# Patient Record
Sex: FEMALE | Race: OTHER | NOT HISPANIC OR LATINO
[De-identification: names, ages, dates, MRNs, and addresses within clinical notes are randomized per-mention and may not be internally consistent; named-entity substitution may affect disease eponyms.]

---

## 2017-11-21 ENCOUNTER — RESULT REVIEW (OUTPATIENT)
Age: 52
End: 2017-11-21

## 2017-12-04 PROBLEM — Z00.00 ENCOUNTER FOR PREVENTIVE HEALTH EXAMINATION: Status: ACTIVE | Noted: 2017-12-04

## 2017-12-12 ENCOUNTER — APPOINTMENT (OUTPATIENT)
Dept: OTOLARYNGOLOGY | Facility: CLINIC | Age: 52
End: 2017-12-12
Payer: MEDICAID

## 2017-12-12 VITALS
HEART RATE: 114 BPM | SYSTOLIC BLOOD PRESSURE: 137 MMHG | DIASTOLIC BLOOD PRESSURE: 93 MMHG | WEIGHT: 215 LBS | HEIGHT: 63.5 IN | BODY MASS INDEX: 37.62 KG/M2

## 2017-12-12 DIAGNOSIS — G89.29 LOW BACK PAIN: ICD-10-CM

## 2017-12-12 DIAGNOSIS — M54.5 LOW BACK PAIN: ICD-10-CM

## 2017-12-12 DIAGNOSIS — K21.9 GASTRO-ESOPHAGEAL REFLUX DISEASE W/OUT ESOPHAGITIS: ICD-10-CM

## 2017-12-12 DIAGNOSIS — E78.5 HYPERLIPIDEMIA, UNSPECIFIED: ICD-10-CM

## 2017-12-12 DIAGNOSIS — E11.9 TYPE 2 DIABETES MELLITUS W/OUT COMPLICATIONS: ICD-10-CM

## 2017-12-12 DIAGNOSIS — E04.1 NONTOXIC SINGLE THYROID NODULE: ICD-10-CM

## 2017-12-12 DIAGNOSIS — J45.909 UNSPECIFIED ASTHMA, UNCOMPLICATED: ICD-10-CM

## 2017-12-12 DIAGNOSIS — Z83.3 FAMILY HISTORY OF DIABETES MELLITUS: ICD-10-CM

## 2017-12-12 DIAGNOSIS — G56.00 CARPAL TUNNEL SYNDROME, UNSPECIFIED UPPER LIMB: ICD-10-CM

## 2017-12-12 DIAGNOSIS — I10 ESSENTIAL (PRIMARY) HYPERTENSION: ICD-10-CM

## 2017-12-12 DIAGNOSIS — Z80.0 FAMILY HISTORY OF MALIGNANT NEOPLASM OF DIGESTIVE ORGANS: ICD-10-CM

## 2017-12-12 PROCEDURE — 99204 OFFICE O/P NEW MOD 45 MIN: CPT | Mod: 25

## 2017-12-12 PROCEDURE — 31575 DIAGNOSTIC LARYNGOSCOPY: CPT

## 2017-12-12 RX ORDER — SPIRONOLACTONE 25 MG/1
25 TABLET ORAL
Refills: 0 | Status: ACTIVE | COMMUNITY

## 2017-12-12 RX ORDER — RAMIPRIL 5 MG/1
5 CAPSULE ORAL
Refills: 0 | Status: ACTIVE | COMMUNITY

## 2017-12-12 RX ORDER — DULOXETINE HYDROCHLORIDE 30 MG/1
CAPSULE, DELAYED RELEASE ORAL
Refills: 0 | Status: ACTIVE | COMMUNITY

## 2017-12-12 RX ORDER — RANITIDINE HYDROCHLORIDE 150 MG/1
150 CAPSULE ORAL
Refills: 0 | Status: ACTIVE | COMMUNITY

## 2017-12-12 RX ORDER — OXYCODONE HYDROCHLORIDE 30 MG/1
TABLET ORAL
Refills: 0 | Status: ACTIVE | COMMUNITY

## 2017-12-12 RX ORDER — TRAZODONE HYDROCHLORIDE 100 MG/1
100 TABLET ORAL
Refills: 0 | Status: ACTIVE | COMMUNITY

## 2017-12-12 RX ORDER — ARIPIPRAZOLE 2 MG/1
TABLET ORAL
Refills: 0 | Status: ACTIVE | COMMUNITY

## 2017-12-12 RX ORDER — ATORVASTATIN CALCIUM 80 MG/1
80 TABLET, FILM COATED ORAL
Refills: 0 | Status: ACTIVE | COMMUNITY

## 2018-05-04 ENCOUNTER — RESULT REVIEW (OUTPATIENT)
Age: 53
End: 2018-05-04

## 2018-07-01 ENCOUNTER — OUTPATIENT (OUTPATIENT)
Dept: OUTPATIENT SERVICES | Facility: HOSPITAL | Age: 53
LOS: 1 days | End: 2018-07-01
Payer: MEDICAID

## 2018-07-01 DIAGNOSIS — Z87.19 PERSONAL HISTORY OF OTHER DISEASES OF THE DIGESTIVE SYSTEM: Chronic | ICD-10-CM

## 2018-07-01 DIAGNOSIS — Z98.89 OTHER SPECIFIED POSTPROCEDURAL STATES: Chronic | ICD-10-CM

## 2018-07-11 DIAGNOSIS — Z71.89 OTHER SPECIFIED COUNSELING: ICD-10-CM

## 2018-07-23 PROBLEM — Z80.0 FAMILY HISTORY OF COLON CANCER: Status: ACTIVE | Noted: 2017-12-12

## 2018-11-01 ENCOUNTER — OUTPATIENT (OUTPATIENT)
Dept: OUTPATIENT SERVICES | Facility: HOSPITAL | Age: 53
LOS: 1 days | End: 2018-11-01
Payer: MEDICAID

## 2018-11-01 DIAGNOSIS — Z98.89 OTHER SPECIFIED POSTPROCEDURAL STATES: Chronic | ICD-10-CM

## 2018-11-01 DIAGNOSIS — Z87.19 PERSONAL HISTORY OF OTHER DISEASES OF THE DIGESTIVE SYSTEM: Chronic | ICD-10-CM

## 2018-11-20 ENCOUNTER — RESULT REVIEW (OUTPATIENT)
Age: 53
End: 2018-11-20

## 2019-02-26 ENCOUNTER — APPOINTMENT (OUTPATIENT)
Dept: OTOLARYNGOLOGY | Facility: CLINIC | Age: 54
End: 2019-02-26
Payer: MEDICAID

## 2019-02-26 VITALS
DIASTOLIC BLOOD PRESSURE: 100 MMHG | HEART RATE: 120 BPM | SYSTOLIC BLOOD PRESSURE: 168 MMHG | TEMPERATURE: 97.9 F | WEIGHT: 222 LBS | OXYGEN SATURATION: 96 % | BODY MASS INDEX: 38.85 KG/M2 | HEIGHT: 63.5 IN

## 2019-02-26 PROCEDURE — 99214 OFFICE O/P EST MOD 30 MIN: CPT | Mod: 25

## 2019-02-26 PROCEDURE — 31575 DIAGNOSTIC LARYNGOSCOPY: CPT

## 2019-02-26 RX ORDER — OMEPRAZOLE 40 MG/1
40 CAPSULE, DELAYED RELEASE ORAL TWICE DAILY
Qty: 60 | Refills: 1 | Status: ACTIVE | COMMUNITY
Start: 2019-02-26 | End: 1900-01-01

## 2019-02-26 NOTE — HISTORY OF PRESENT ILLNESS
[de-identified] : This is a patient referred by Dr Taylor for thyroid nodule. This was found a few years ago ago during physical exam/US.\par No dysphonia or dyspnea. C/O dysphagia, and that she has to swallow twice for the food to go down, and occasional choking. No weight loss. C/O flegm in the back of the throat and constant throat clearing. Patient has heartburn and has been on Omeprazole for that. Also has h/o nasal allergies.\par Patient denies h/o radiation and has no family h/o thyroid cancer.\par US from Nov/2018 showed that nodule has been stable compared to US from March/2018. Previous biopsy was benign.\par

## 2019-02-26 NOTE — PHYSICAL EXAM
[Midline] : trachea located in midline position [Normal] : no rashes [de-identified] : Mobile, nontender, 5 cm R thyroid nodule.

## 2019-02-26 NOTE — CONSULT LETTER
[Dear  ___] : Dear  [unfilled], [Consult Letter:] : I had the pleasure of evaluating your patient, [unfilled]. [Please see my note below.] : Please see my note below. [Consult Closing:] : Thank you very much for allowing me to participate in the care of this patient.  If you have any questions, please do not hesitate to contact me. [Sincerely,] : Sincerely, [FreeTextEntry2] : Dr Cleveland Taylor. [FreeTextEntry3] : \par Sameer Hanna MD, FACS\par \par Otolaryngology-Head and Neck Surgery\par [employer] [facility]\par

## 2019-03-28 ENCOUNTER — APPOINTMENT (OUTPATIENT)
Dept: OTOLARYNGOLOGY | Facility: CLINIC | Age: 54
End: 2019-03-28

## 2019-04-08 ENCOUNTER — APPOINTMENT (OUTPATIENT)
Dept: OTOLARYNGOLOGY | Facility: CLINIC | Age: 54
End: 2019-04-08

## 2019-05-08 ENCOUNTER — RESULT REVIEW (OUTPATIENT)
Age: 54
End: 2019-05-08

## 2019-08-14 DIAGNOSIS — Z71.89 OTHER SPECIFIED COUNSELING: ICD-10-CM

## 2020-02-01 PROCEDURE — H0002: CPT

## 2020-12-01 PROCEDURE — G9005: CPT

## 2021-01-01 ENCOUNTER — OUTPATIENT (OUTPATIENT)
Dept: OUTPATIENT SERVICES | Facility: HOSPITAL | Age: 56
LOS: 1 days | End: 2021-01-01
Payer: MEDICARE

## 2021-01-01 DIAGNOSIS — Z98.89 OTHER SPECIFIED POSTPROCEDURAL STATES: Chronic | ICD-10-CM

## 2021-01-01 DIAGNOSIS — Z87.19 PERSONAL HISTORY OF OTHER DISEASES OF THE DIGESTIVE SYSTEM: Chronic | ICD-10-CM

## 2021-02-01 PROCEDURE — G9005: CPT

## 2021-02-15 DIAGNOSIS — Z71.89 OTHER SPECIFIED COUNSELING: ICD-10-CM

## 2021-10-06 PROBLEM — I10 ESSENTIAL HYPERTENSION: Status: ACTIVE | Noted: 2017-12-12

## 2022-01-06 ENCOUNTER — APPOINTMENT (OUTPATIENT)
Dept: SURGERY | Facility: CLINIC | Age: 57
End: 2022-01-06
Payer: MEDICARE

## 2022-01-06 VITALS
DIASTOLIC BLOOD PRESSURE: 76 MMHG | WEIGHT: 225 LBS | HEART RATE: 90 BPM | HEIGHT: 63 IN | BODY MASS INDEX: 39.87 KG/M2 | SYSTOLIC BLOOD PRESSURE: 117 MMHG

## 2022-01-06 PROCEDURE — 99204 OFFICE O/P NEW MOD 45 MIN: CPT

## 2022-01-08 RX ORDER — INSULIN LISPRO 100 [IU]/ML
100 INJECTION, SOLUTION INTRAVENOUS; SUBCUTANEOUS
Refills: 0 | Status: ACTIVE | COMMUNITY

## 2022-01-08 RX ORDER — AMLODIPINE BESYLATE 10 MG/1
10 TABLET ORAL
Refills: 0 | Status: DISCONTINUED | COMMUNITY
End: 2022-01-08

## 2022-01-08 RX ORDER — FLUTICASONE FUROATE AND VILANTEROL TRIFENATATE 50; 25 UG/1; UG/1
POWDER RESPIRATORY (INHALATION)
Refills: 0 | Status: ACTIVE | COMMUNITY

## 2022-01-08 RX ORDER — DILTIAZEM HYDROCHLORIDE 240 MG/1
240 CAPSULE, EXTENDED RELEASE ORAL
Refills: 0 | Status: ACTIVE | COMMUNITY

## 2022-01-08 RX ORDER — METFORMIN HYDROCHLORIDE 500 MG/1
500 TABLET, COATED ORAL
Refills: 0 | Status: DISCONTINUED | COMMUNITY
End: 2022-01-08

## 2022-01-08 RX ORDER — METOPROLOL TARTRATE 100 MG/1
100 TABLET, FILM COATED ORAL
Refills: 0 | Status: ACTIVE | COMMUNITY

## 2022-01-08 RX ORDER — INSULIN DETEMIR 100 [IU]/ML
INJECTION, SOLUTION SUBCUTANEOUS
Refills: 0 | Status: ACTIVE | COMMUNITY

## 2022-01-08 RX ORDER — FUROSEMIDE 40 MG/1
40 TABLET ORAL
Refills: 0 | Status: ACTIVE | COMMUNITY

## 2022-01-08 RX ORDER — ALBUTEROL SULFATE 5 MG/ML
SOLUTION, NON-ORAL INHALATION
Refills: 0 | Status: ACTIVE | COMMUNITY

## 2022-01-08 RX ORDER — BACLOFEN 10 MG/1
10 TABLET ORAL
Refills: 0 | Status: ACTIVE | COMMUNITY

## 2022-01-08 RX ORDER — BUPROPION HYDROCHLORIDE 150 MG/1
150 TABLET, FILM COATED, EXTENDED RELEASE ORAL
Refills: 0 | Status: ACTIVE | COMMUNITY

## 2022-01-08 RX ORDER — EZETIMIBE 10 MG/1
10 TABLET ORAL
Refills: 0 | Status: ACTIVE | COMMUNITY

## 2022-01-08 RX ORDER — DULAGLUTIDE 0.75 MG/.5ML
0.75 INJECTION, SOLUTION SUBCUTANEOUS
Refills: 0 | Status: ACTIVE | COMMUNITY

## 2022-01-08 NOTE — PHYSICAL EXAM
[de-identified] : Short thick neck, no cervical or supraclavicular adenopathy, trachea midline, thyroid with R>L  enlargement extending behind clavicle [Normal] : no neck adenopathy [de-identified] : Skin:  normal appearance.  no rash, nodules, vesicles, or erythema,\par Musculoskeletal:  full range of motion and no deformities appreciated\par Neurological:  grossly intact\par Psychiatric:  oriented to person, place and time with appropriate affect

## 2022-01-08 NOTE — HISTORY OF PRESENT ILLNESS
[de-identified] : Patient referred by Dr. Corona for evaluation of large multinodular goiter and primary hyperparathyroidism.  Patient reports a long history of thyroid enlargement with worsening dysphagia.  Prior biopsies benign.  Reports occasional voice change with history of reflux, denies shortness of breath or radiation exposure.  Thyroid ultrasound May 2021: Right lobe 6.7 x 3.1 x 3.9 cm with 4.5 x 4 x 3.2 cm nodule.  Left lobe 4.3 x 1.3 x 1.9 cm with mid nodule 2 x 1 x 1.2 cm and lower 6 x 3 x 5 mm nodule.  Isthmus nodule 13 x 10 x 9 mm.  No enlarged lymph nodes.  Biopsy left mid nodule December 2020 benign.  Blood work August 2021: Calcium 10.3, creatinine 1.1, PTH 69, vitamin D 38.  Sestamibi August 2021: Possible parathyroid posterior to right upper pole. I have reviewed all old and new data and available images.  Additional information was obtained from others present at the time of the visit to ensure the completeness of the history.\par

## 2022-01-08 NOTE — ASSESSMENT
[FreeTextEntry1] : Patient with enlarging increasingly symptomatic substernal goiter and blood work consistent with mild primary hyperparathyroidism.  I have recommended a total thyroidectomy with a parathyroid exploration.  I have requested a 4-dimensional CT scan to assess the location of a parathyroid adenoma. I have reviewed the pathophysiology of the disease process, the area anatomy and the rationale for surgery.  I discussed the risks, benefits and alternative treatments which include but are not limited to bleeding, infection, numbness, hoarseness, hypocalcemia, scarring, and need for reoperation.  I have answered the patient's questions to their satisfaction.  The patient wishes to proceed with the recommended procedure.  They will contact my office to schedule surgery.\par

## 2022-01-08 NOTE — REASON FOR VISIT
[Initial Consultation] : an initial consultation for [FreeTextEntry2] : Substernal goiter and multinodular goiter. [Spouse] : spouse

## 2022-01-08 NOTE — CONSULT LETTER
[Dear  ___] : Dear  [unfilled], [Consult Letter:] : I had the pleasure of evaluating your patient, [unfilled]. [Please see my note below.] : Please see my note below. [Consult Closing:] : Thank you very much for allowing me to participate in the care of this patient.  If you have any questions, please do not hesitate to contact me. [Sincerely,] : Sincerely, [FreeTextEntry2] : Dr. Fidencio Corona [FreeTextEntry3] : Lizbeth Coronado MD, FACS\par Assistant Professor of Surgery and Otolaryngology\par [facility]\par

## 2022-01-13 ENCOUNTER — OUTPATIENT (OUTPATIENT)
Dept: OUTPATIENT SERVICES | Facility: HOSPITAL | Age: 57
LOS: 1 days | End: 2022-01-13
Payer: MEDICAID

## 2022-01-13 ENCOUNTER — APPOINTMENT (OUTPATIENT)
Dept: CT IMAGING | Facility: IMAGING CENTER | Age: 57
End: 2022-01-13
Payer: MEDICAID

## 2022-01-13 ENCOUNTER — RESULT REVIEW (OUTPATIENT)
Age: 57
End: 2022-01-13

## 2022-01-13 DIAGNOSIS — Z87.19 PERSONAL HISTORY OF OTHER DISEASES OF THE DIGESTIVE SYSTEM: Chronic | ICD-10-CM

## 2022-01-13 DIAGNOSIS — E04.9 NONTOXIC GOITER, UNSPECIFIED: ICD-10-CM

## 2022-01-13 DIAGNOSIS — Z98.89 OTHER SPECIFIED POSTPROCEDURAL STATES: Chronic | ICD-10-CM

## 2022-01-13 DIAGNOSIS — E04.2 NONTOXIC MULTINODULAR GOITER: ICD-10-CM

## 2022-01-13 DIAGNOSIS — E21.0 PRIMARY HYPERPARATHYROIDISM: ICD-10-CM

## 2022-01-13 PROCEDURE — 70491 CT SOFT TISSUE NECK W/DYE: CPT | Mod: 26

## 2022-01-13 PROCEDURE — 70492 CT SFT TSUE NCK W/O & W/DYE: CPT

## 2022-02-03 ENCOUNTER — NON-APPOINTMENT (OUTPATIENT)
Age: 57
End: 2022-02-03

## 2022-02-14 ENCOUNTER — APPOINTMENT (OUTPATIENT)
Dept: SURGERY | Facility: HOSPITAL | Age: 57
End: 2022-02-14

## 2022-03-08 ENCOUNTER — NON-APPOINTMENT (OUTPATIENT)
Age: 57
End: 2022-03-08

## 2022-03-12 ENCOUNTER — NON-APPOINTMENT (OUTPATIENT)
Age: 57
End: 2022-03-12

## 2022-03-21 ENCOUNTER — OUTPATIENT (OUTPATIENT)
Dept: OUTPATIENT SERVICES | Facility: HOSPITAL | Age: 57
LOS: 1 days | End: 2022-03-21
Payer: MEDICARE

## 2022-03-21 VITALS
HEART RATE: 100 BPM | RESPIRATION RATE: 18 BRPM | OXYGEN SATURATION: 94 % | SYSTOLIC BLOOD PRESSURE: 121 MMHG | TEMPERATURE: 98 F | DIASTOLIC BLOOD PRESSURE: 74 MMHG | HEIGHT: 64 IN | WEIGHT: 235.01 LBS

## 2022-03-21 DIAGNOSIS — I10 ESSENTIAL (PRIMARY) HYPERTENSION: ICD-10-CM

## 2022-03-21 DIAGNOSIS — Z01.818 ENCOUNTER FOR OTHER PREPROCEDURAL EXAMINATION: ICD-10-CM

## 2022-03-21 DIAGNOSIS — G47.33 OBSTRUCTIVE SLEEP APNEA (ADULT) (PEDIATRIC): ICD-10-CM

## 2022-03-21 DIAGNOSIS — J45.909 UNSPECIFIED ASTHMA, UNCOMPLICATED: ICD-10-CM

## 2022-03-21 DIAGNOSIS — Z98.89 OTHER SPECIFIED POSTPROCEDURAL STATES: Chronic | ICD-10-CM

## 2022-03-21 DIAGNOSIS — E04.9 NONTOXIC GOITER, UNSPECIFIED: ICD-10-CM

## 2022-03-21 DIAGNOSIS — Z87.19 PERSONAL HISTORY OF OTHER DISEASES OF THE DIGESTIVE SYSTEM: Chronic | ICD-10-CM

## 2022-03-21 DIAGNOSIS — E04.2 NONTOXIC MULTINODULAR GOITER: ICD-10-CM

## 2022-03-21 DIAGNOSIS — E21.0 PRIMARY HYPERPARATHYROIDISM: ICD-10-CM

## 2022-03-21 DIAGNOSIS — K21.9 GASTRO-ESOPHAGEAL REFLUX DISEASE WITHOUT ESOPHAGITIS: ICD-10-CM

## 2022-03-21 DIAGNOSIS — E11.9 TYPE 2 DIABETES MELLITUS WITHOUT COMPLICATIONS: ICD-10-CM

## 2022-03-21 PROCEDURE — G0463: CPT

## 2022-03-21 NOTE — H&P PST ADULT - HISTORY OF PRESENT ILLNESS
58 y/o female with PMh DM (type 2 56 y/o female with PMH DM (type 2), OS ( CPAP nightly), COVID-19 ( 4/2020, 1/2022 - both cases without pulmonary complications), arthritis, GERD, vertigo, asthma ( denies recent exacerbation or hx of intubation), chronic systolic heart failure, anemia, HTN, HLD, bipolar disorder, and depression presents for PST.  Patient reports that she has had a right sided goiter that was monitored by her endocrinologist and has been increasing in size.  Was negative for malignancy but recommended for surgical removal.  patient was also discovered to have eleavterd calcium leves requiring additional work up and was found to have hyperparathyroidism  Feeling well today at PST with no recent cough, fever or illness since recovering from COVID-19 in Jan 2022.  Scheduled for total thyroidectomy w/resection substernal goiter, parathyroidectomy w PTH monitoring with Dr Coronado on 03/28/2022.  COVID-19 testing TBS - information provided

## 2022-03-21 NOTE — H&P PST ADULT - NSICDXPASTSURGICALHX_GEN_ALL_CORE_FT
PAST SURGICAL HISTORY:  H/O hiatal hernia repair    History of carpal tunnel surgery of right wrist     S/P  section     S/P dilation and curettage 2 yrs ago

## 2022-03-21 NOTE — H&P PST ADULT - NSICDXPASTMEDICALHX_GEN_ALL_CORE_FT
PAST MEDICAL HISTORY:  Acid reflux     Asthma no recent exacerbation    Bronchitis 2 weeks ago    Carpal tunnel syndrome     Chronic systolic heart failure     Diverticulosis     DM2 (diabetes mellitus, type 2)     History of 2019 novel coronavirus disease (COVID-19) 4/2020, 1/2022 - not hospitalized    HLD (hyperlipidemia)     HTN (hypertension)     Pulmonary hypertension     Sleep apnea, obstructive on CPAP mask    Thyroid nodule     Vertigo      PAST MEDICAL HISTORY:  Acid reflux     Arthritis     Asthma no recent exacerbation    Carpal tunnel syndrome     Chronic systolic heart failure     Diverticulosis     DM2 (diabetes mellitus, type 2)     History of 2019 novel coronavirus disease (COVID-19) 4/2020, 1/2022 - not hospitalized    HLD (hyperlipidemia)     HTN (hypertension)     Primary hyperparathyroidism     Pulmonary hypertension     Sleep apnea, obstructive on CPAP mask    Thyroid nodule     Vertigo

## 2022-03-21 NOTE — H&P PST ADULT - NEGATIVE ENMT SYMPTOMS
no hearing difficulty/no ear pain/no vertigo/no nasal discharge/no nasal obstruction/no post-nasal discharge/no nose bleeds/no throat pain

## 2022-03-21 NOTE — H&P PST ADULT - NSICDXFAMILYHX_GEN_ALL_CORE_FT
FAMILY HISTORY:  Father  Still living? No  Family history of colon cancer, Age at diagnosis: Age Unknown    Mother  Still living? No  Family history of myocardial infarction, Age at diagnosis: Age Unknown

## 2022-03-21 NOTE — H&P PST ADULT - PROBLEM SELECTOR PLAN 1
Scheduled for total thyroidectomy w/ resection substernal goiter, parathyroidectomy w/PTH monitor with Dr Coronado on 03/28/2022.  CBC, BMP, EKG on chart.  Pending medical and cardio clearance.  Pre op instructions given and patient verbalized understanding.  Chlorhexidene wash given with instructions.  AGNIESZKA precautions.  NPO after midnight, May take PPI and antihypertensives with small sip of water AM of surgery.  TO stop all asa, NSAIDs, vitamins and supplements 1 week prior to procedure.  UCG AM of surgery

## 2022-03-21 NOTE — H&P PST ADULT - MUSCULOSKELETAL COMMENTS
lower back, neck pain, bilateral knee pain bilateral knee pain with flexion and extension - per patient her baseline

## 2022-03-25 PROBLEM — E11.9 TYPE 2 DIABETES MELLITUS WITHOUT COMPLICATIONS: Chronic | Status: ACTIVE | Noted: 2022-03-21

## 2022-03-25 PROBLEM — I50.22 CHRONIC SYSTOLIC (CONGESTIVE) HEART FAILURE: Chronic | Status: ACTIVE | Noted: 2022-03-21

## 2022-03-25 PROBLEM — Z86.16 PERSONAL HISTORY OF COVID-19: Chronic | Status: ACTIVE | Noted: 2022-03-21

## 2022-03-25 PROBLEM — I27.20 PULMONARY HYPERTENSION, UNSPECIFIED: Chronic | Status: ACTIVE | Noted: 2022-03-21

## 2022-03-25 PROBLEM — E21.0 PRIMARY HYPERPARATHYROIDISM: Chronic | Status: ACTIVE | Noted: 2022-03-21

## 2022-03-25 PROBLEM — M19.90 UNSPECIFIED OSTEOARTHRITIS, UNSPECIFIED SITE: Chronic | Status: ACTIVE | Noted: 2022-03-21

## 2022-03-27 ENCOUNTER — TRANSCRIPTION ENCOUNTER (OUTPATIENT)
Age: 57
End: 2022-03-27

## 2022-03-28 ENCOUNTER — RESULT REVIEW (OUTPATIENT)
Age: 57
End: 2022-03-28

## 2022-03-28 ENCOUNTER — APPOINTMENT (OUTPATIENT)
Dept: SURGERY | Facility: HOSPITAL | Age: 57
End: 2022-03-28
Payer: MEDICAID

## 2022-03-28 ENCOUNTER — OUTPATIENT (OUTPATIENT)
Dept: OUTPATIENT SERVICES | Facility: HOSPITAL | Age: 57
LOS: 1 days | End: 2022-03-28
Payer: MEDICARE

## 2022-03-28 ENCOUNTER — TRANSCRIPTION ENCOUNTER (OUTPATIENT)
Age: 57
End: 2022-03-28

## 2022-03-28 VITALS
TEMPERATURE: 98 F | OXYGEN SATURATION: 95 % | RESPIRATION RATE: 18 BRPM | DIASTOLIC BLOOD PRESSURE: 67 MMHG | SYSTOLIC BLOOD PRESSURE: 130 MMHG | HEART RATE: 73 BPM

## 2022-03-28 VITALS
DIASTOLIC BLOOD PRESSURE: 78 MMHG | RESPIRATION RATE: 16 BRPM | WEIGHT: 235.01 LBS | HEIGHT: 64 IN | OXYGEN SATURATION: 94 % | HEART RATE: 92 BPM | SYSTOLIC BLOOD PRESSURE: 118 MMHG | TEMPERATURE: 97 F

## 2022-03-28 DIAGNOSIS — Z98.89 OTHER SPECIFIED POSTPROCEDURAL STATES: Chronic | ICD-10-CM

## 2022-03-28 DIAGNOSIS — E21.0 PRIMARY HYPERPARATHYROIDISM: ICD-10-CM

## 2022-03-28 DIAGNOSIS — Z87.19 PERSONAL HISTORY OF OTHER DISEASES OF THE DIGESTIVE SYSTEM: Chronic | ICD-10-CM

## 2022-03-28 DIAGNOSIS — E04.9 NONTOXIC GOITER, UNSPECIFIED: ICD-10-CM

## 2022-03-28 DIAGNOSIS — E04.2 NONTOXIC MULTINODULAR GOITER: ICD-10-CM

## 2022-03-28 LAB
GLUCOSE BLDC GLUCOMTR-MCNC: 111 MG/DL — HIGH (ref 70–99)
GLUCOSE BLDC GLUCOMTR-MCNC: 197 MG/DL — HIGH (ref 70–99)
PTH INTACT, INTRAOP SPECIMEN 2: SIGNIFICANT CHANGE UP
PTH INTACT, INTRAOP SPECIMEN 3: SIGNIFICANT CHANGE UP
PTH INTACT, INTRAOP SPECIMEN 4: SIGNIFICANT CHANGE UP
PTH INTACT, INTRAOP SPECIMEN 5: SIGNIFICANT CHANGE UP
PTH INTACT, INTRAOP SPECIMEN 6: SIGNIFICANT CHANGE UP
PTH INTACT, INTRAOP TIMING 2: SIGNIFICANT CHANGE UP
PTH INTACT, INTRAOP TIMING 3: SIGNIFICANT CHANGE UP
PTH INTACT, INTRAOP TIMING 4: SIGNIFICANT CHANGE UP
PTH INTACT, INTRAOP TIMING 5: SIGNIFICANT CHANGE UP
PTH INTACT, INTRAOP TIMING 6: SIGNIFICANT CHANGE UP
PTH INTACT, INTRAOPERATIVE 2: 60 PG/ML — SIGNIFICANT CHANGE UP (ref 15–65)
PTH INTACT, INTRAOPERATIVE 3: 68 PG/ML — HIGH (ref 15–65)
PTH INTACT, INTRAOPERATIVE 4: 33 PG/ML — SIGNIFICANT CHANGE UP (ref 15–65)
PTH INTACT, INTRAOPERATIVE 5: 21 PG/ML — SIGNIFICANT CHANGE UP (ref 15–65)
PTH INTACT, INTRAOPERATIVE 6: 16 PG/ML — SIGNIFICANT CHANGE UP (ref 15–65)
PTH-INTACT IO % DIF SERPL: 72 PG/ML — HIGH (ref 15–65)

## 2022-03-28 PROCEDURE — 36415 COLL VENOUS BLD VENIPUNCTURE: CPT

## 2022-03-28 PROCEDURE — 60271 REMOVAL OF THYROID: CPT

## 2022-03-28 PROCEDURE — 13132 CMPLX RPR F/C/C/M/N/AX/G/H/F: CPT | Mod: 59

## 2022-03-28 PROCEDURE — 83970 ASSAY OF PARATHORMONE: CPT

## 2022-03-28 PROCEDURE — C9399: CPT

## 2022-03-28 PROCEDURE — 60220 PARTIAL REMOVAL OF THYROID: CPT | Mod: XU

## 2022-03-28 PROCEDURE — 88307 TISSUE EXAM BY PATHOLOGIST: CPT

## 2022-03-28 PROCEDURE — 60500 EXPLORE PARATHYROID GLANDS: CPT

## 2022-03-28 PROCEDURE — 88307 TISSUE EXAM BY PATHOLOGIST: CPT | Mod: 26

## 2022-03-28 PROCEDURE — 88331 PATH CONSLTJ SURG 1 BLK 1SPC: CPT | Mod: 26

## 2022-03-28 PROCEDURE — 13133 CMPLX RPR F/C/C/M/N/AX/G/H/F: CPT | Mod: 59

## 2022-03-28 PROCEDURE — 82962 GLUCOSE BLOOD TEST: CPT

## 2022-03-28 PROCEDURE — 88332 PATH CONSLTJ SURG EA ADD BLK: CPT | Mod: 26

## 2022-03-28 PROCEDURE — 88331 PATH CONSLTJ SURG 1 BLK 1SPC: CPT

## 2022-03-28 PROCEDURE — 88305 TISSUE EXAM BY PATHOLOGIST: CPT

## 2022-03-28 PROCEDURE — 60210 PARTIAL THYROID EXCISION: CPT | Mod: AS

## 2022-03-28 PROCEDURE — 88332 PATH CONSLTJ SURG EA ADD BLK: CPT

## 2022-03-28 PROCEDURE — 88305 TISSUE EXAM BY PATHOLOGIST: CPT | Mod: 26

## 2022-03-28 RX ORDER — BENZOCAINE AND MENTHOL 5; 1 G/100ML; G/100ML
1 LIQUID ORAL
Qty: 0 | Refills: 0 | DISCHARGE
Start: 2022-03-28

## 2022-03-28 RX ORDER — EZETIMIBE 10 MG/1
1 TABLET ORAL
Qty: 0 | Refills: 0 | DISCHARGE

## 2022-03-28 RX ORDER — ACETAMINOPHEN 500 MG
650 TABLET ORAL ONCE
Refills: 0 | Status: COMPLETED | OUTPATIENT
Start: 2022-03-28 | End: 2022-03-28

## 2022-03-28 RX ORDER — AMITRIPTYLINE HCL 25 MG
1 TABLET ORAL
Qty: 0 | Refills: 0 | DISCHARGE

## 2022-03-28 RX ORDER — METOPROLOL TARTRATE 50 MG
1 TABLET ORAL
Qty: 0 | Refills: 0 | DISCHARGE

## 2022-03-28 RX ORDER — DILTIAZEM HCL 120 MG
1 CAPSULE, EXT RELEASE 24 HR ORAL
Qty: 0 | Refills: 0 | DISCHARGE

## 2022-03-28 RX ORDER — FUROSEMIDE 40 MG
1 TABLET ORAL
Qty: 0 | Refills: 0 | DISCHARGE

## 2022-03-28 RX ORDER — INSULIN LISPRO 100/ML
16 VIAL (ML) SUBCUTANEOUS
Qty: 0 | Refills: 0 | DISCHARGE

## 2022-03-28 RX ORDER — BUPROPION HYDROCHLORIDE 150 MG/1
1 TABLET, EXTENDED RELEASE ORAL
Qty: 0 | Refills: 0 | DISCHARGE

## 2022-03-28 RX ORDER — FLUTICASONE FUROATE AND VILANTEROL TRIFENATATE 100; 25 UG/1; UG/1
0 POWDER RESPIRATORY (INHALATION)
Qty: 0 | Refills: 0 | DISCHARGE

## 2022-03-28 RX ORDER — BACLOFEN 100 %
1 POWDER (GRAM) MISCELLANEOUS
Qty: 0 | Refills: 0 | DISCHARGE

## 2022-03-28 RX ORDER — FLUTICASONE FUROATE AND VILANTEROL TRIFENATATE 100; 25 UG/1; UG/1
1 POWDER RESPIRATORY (INHALATION)
Qty: 0 | Refills: 0 | DISCHARGE

## 2022-03-28 RX ORDER — MULTIVIT-MIN/FERROUS GLUCONATE 9 MG/15 ML
1 LIQUID (ML) ORAL
Qty: 0 | Refills: 0 | DISCHARGE

## 2022-03-28 RX ORDER — DULOXETINE HYDROCHLORIDE 30 MG/1
120 CAPSULE, DELAYED RELEASE ORAL
Qty: 0 | Refills: 0 | DISCHARGE

## 2022-03-28 RX ORDER — BENZOCAINE AND MENTHOL 5; 1 G/100ML; G/100ML
1 LIQUID ORAL
Refills: 0 | Status: DISCONTINUED | OUTPATIENT
Start: 2022-03-28 | End: 2022-04-11

## 2022-03-28 RX ORDER — SACUBITRIL AND VALSARTAN 24; 26 MG/1; MG/1
1 TABLET, FILM COATED ORAL
Qty: 0 | Refills: 0 | DISCHARGE

## 2022-03-28 RX ORDER — OXYCODONE HYDROCHLORIDE 5 MG/1
5 TABLET ORAL EVERY 6 HOURS
Refills: 0 | Status: DISCONTINUED | OUTPATIENT
Start: 2022-03-28 | End: 2022-03-28

## 2022-03-28 RX ORDER — SODIUM CHLORIDE 9 MG/ML
1000 INJECTION, SOLUTION INTRAVENOUS
Refills: 0 | Status: DISCONTINUED | OUTPATIENT
Start: 2022-03-28 | End: 2022-03-28

## 2022-03-28 RX ORDER — ONDANSETRON 8 MG/1
4 TABLET, FILM COATED ORAL ONCE
Refills: 0 | Status: DISCONTINUED | OUTPATIENT
Start: 2022-03-28 | End: 2022-03-28

## 2022-03-28 RX ORDER — ATORVASTATIN CALCIUM 80 MG/1
1 TABLET, FILM COATED ORAL
Qty: 0 | Refills: 0 | DISCHARGE

## 2022-03-28 RX ORDER — DULAGLUTIDE 4.5 MG/.5ML
0 INJECTION, SOLUTION SUBCUTANEOUS
Qty: 0 | Refills: 0 | DISCHARGE

## 2022-03-28 RX ORDER — CALCITRIOL 0.5 UG/1
0.5 CAPSULE ORAL
Refills: 0 | Status: DISCONTINUED | OUTPATIENT
Start: 2022-03-28 | End: 2022-04-11

## 2022-03-28 RX ORDER — INSULIN DETEMIR 100/ML (3)
40 INSULIN PEN (ML) SUBCUTANEOUS
Qty: 0 | Refills: 0 | DISCHARGE

## 2022-03-28 RX ORDER — CALCITRIOL 0.5 UG/1
1 CAPSULE ORAL
Qty: 60 | Refills: 0
Start: 2022-03-28 | End: 2022-04-26

## 2022-03-28 RX ORDER — OXYCODONE HYDROCHLORIDE 5 MG/1
1 TABLET ORAL
Qty: 3 | Refills: 0
Start: 2022-03-28 | End: 2022-03-28

## 2022-03-28 RX ORDER — SPIRONOLACTONE 25 MG/1
1 TABLET, FILM COATED ORAL
Qty: 0 | Refills: 0 | DISCHARGE

## 2022-03-28 RX ORDER — HYDROMORPHONE HYDROCHLORIDE 2 MG/ML
0.5 INJECTION INTRAMUSCULAR; INTRAVENOUS; SUBCUTANEOUS
Refills: 0 | Status: DISCONTINUED | OUTPATIENT
Start: 2022-03-28 | End: 2022-03-28

## 2022-03-28 RX ORDER — ARIPIPRAZOLE 15 MG/1
1 TABLET ORAL
Qty: 0 | Refills: 0 | DISCHARGE

## 2022-03-28 RX ORDER — HYDROMORPHONE HYDROCHLORIDE 2 MG/ML
1 INJECTION INTRAMUSCULAR; INTRAVENOUS; SUBCUTANEOUS
Refills: 0 | Status: DISCONTINUED | OUTPATIENT
Start: 2022-03-28 | End: 2022-03-28

## 2022-03-28 RX ADMIN — Medication 650 MILLIGRAM(S): at 15:10

## 2022-03-28 RX ADMIN — Medication 2 TABLET(S): at 12:25

## 2022-03-28 RX ADMIN — Medication 650 MILLIGRAM(S): at 14:38

## 2022-03-28 RX ADMIN — CALCITRIOL 0.5 MICROGRAM(S): 0.5 CAPSULE ORAL at 12:25

## 2022-03-28 RX ADMIN — SODIUM CHLORIDE 30 MILLILITER(S): 9 INJECTION, SOLUTION INTRAVENOUS at 06:48

## 2022-03-28 NOTE — ASU PATIENT PROFILE, ADULT - FALL HARM RISK - UNIVERSAL INTERVENTIONS
Bed in lowest position, wheels locked, appropriate side rails in place/Call bell, personal items and telephone in reach/Instruct patient to call for assistance before getting out of bed or chair/Non-slip footwear when patient is out of bed/[place] to call system/Physically safe environment - no spills, clutter or unnecessary equipment/Purposeful Proactive Rounding/Room/bathroom lighting operational, light cord in reach

## 2022-03-28 NOTE — BRIEF OPERATIVE NOTE - NSICDXBRIEFPOSTOP_GEN_ALL_CORE_FT
POST-OP DIAGNOSIS:  Right thyroid nodule 28-Mar-2022 11:10:23 and substernal goiter / hyperparathyroidism Preethi Pardo

## 2022-03-28 NOTE — ASU DISCHARGE PLAN (ADULT/PEDIATRIC) - NS MD DC FALL RISK RISK
For information on Fall & Injury Prevention, visit: [email or web address] OR  [email or web address] OR  https://www.cdc.gov/steadi/patient.html

## 2022-03-28 NOTE — ASU PATIENT PROFILE, ADULT - NSICDXPASTMEDICALHX_GEN_ALL_CORE_FT
PAST MEDICAL HISTORY:  Acid reflux     Arthritis     Asthma no recent exacerbation    Carpal tunnel syndrome     Chronic systolic heart failure     Diverticulosis     DM2 (diabetes mellitus, type 2)     History of 2019 novel coronavirus disease (COVID-19) 4/2020, 1/2022 - not hospitalized    HLD (hyperlipidemia)     HTN (hypertension)     Primary hyperparathyroidism     Pulmonary hypertension     Sleep apnea, obstructive on CPAP mask    Thyroid nodule     Vertigo

## 2022-03-28 NOTE — ASU DISCHARGE PLAN (ADULT/PEDIATRIC) - CARE PROVIDER_API CALL
Lizbeth Coronado (MD)  Surgery  [address], [address]  [place] [address]  Phone: [phone]  Fax: [phone]  Scheduled Appointment: 03/29/2022

## 2022-03-28 NOTE — ASU PATIENT PROFILE, ADULT - PAIN LOCATION, PROFILE
lower back, bilateral knees, neck/upper/lower lower back, bilateral knees, neck, sciatica/upper/lower

## 2022-03-28 NOTE — BRIEF OPERATIVE NOTE - NSICDXBRIEFPROCEDURE_GEN_ALL_CORE_FT
PROCEDURES:  Thyroid lobectomy, right 28-Mar-2022 11:13:20 with substernal goiter /parathyroidectomy Preethi Pardo

## 2022-03-28 NOTE — ASU DISCHARGE PLAN (ADULT/PEDIATRIC) - ASU DC SPECIAL INSTRUCTIONSFT
Elevate head to 30 degrees when sleeping   ice pack to neck as needed   cepacol lozenges for sore throat as needed may purchase over the counter   Wear bra for 2 weeks day and night to take pressure off incision   Take tylenol for pain as needed and directed 2 tabs 650mg po every 6 hours   Take your percocet if pain severe - you have pain meds at home   empty and record drains twice a day or as needed   Follow up with Dr Coronado tomorrow for drain removal - call office for time   Take calcium supplement s as prescribed to pharmacy Elevate head to 30 degrees when sleeping   ice pack to neck as needed   cepacol lozenges for sore throat as needed may purchase over the counter   Wear bra for 2 weeks day and night to take pressure off incision   Take tylenol for pain as needed and directed 2 tabs 650mg po every 6 hours   Take your percocet if pain severe - you have pain meds at home   empty and record drains twice a day or as needed   Follow up with Dr Coronado tomorrow for drain removal - at 2:30 pm  Take calcium supplement s as prescribed to pharmacy

## 2022-03-28 NOTE — BRIEF OPERATIVE NOTE - NSICDXBRIEFPREOP_GEN_ALL_CORE_FT
PRE-OP DIAGNOSIS:  Multiple thyroid nodules 28-Mar-2022 11:08:37 and hyperparathyroidism Preethi Pardo

## 2022-03-29 ENCOUNTER — APPOINTMENT (OUTPATIENT)
Dept: SURGERY | Facility: CLINIC | Age: 57
End: 2022-03-29
Payer: MEDICAID

## 2022-03-29 PROCEDURE — 99024 POSTOP FOLLOW-UP VISIT: CPT

## 2022-03-29 PROCEDURE — 36415 COLL VENOUS BLD VENIPUNCTURE: CPT

## 2022-03-29 RX ORDER — BLOOD SUGAR DIAGNOSTIC
STRIP MISCELLANEOUS
Qty: 300 | Refills: 0 | Status: ACTIVE | COMMUNITY
Start: 2022-01-11

## 2022-03-29 RX ORDER — ARIPIPRAZOLE 20 MG/1
20 TABLET ORAL
Qty: 30 | Refills: 0 | Status: ACTIVE | COMMUNITY
Start: 2022-02-01

## 2022-03-29 RX ORDER — SPIRONOLACTONE 50 MG/1
50 TABLET ORAL
Qty: 90 | Refills: 0 | Status: ACTIVE | COMMUNITY
Start: 2021-05-24

## 2022-03-29 RX ORDER — DULOXETINE HYDROCHLORIDE 60 MG/1
60 CAPSULE, DELAYED RELEASE PELLETS ORAL
Qty: 60 | Refills: 0 | Status: ACTIVE | COMMUNITY
Start: 2021-11-24

## 2022-03-29 RX ORDER — FLASH GLUCOSE SENSOR
KIT MISCELLANEOUS
Qty: 2 | Refills: 0 | Status: ACTIVE | COMMUNITY
Start: 2021-10-26

## 2022-03-29 RX ORDER — FLUCONAZOLE 150 MG/1
150 TABLET ORAL
Qty: 2 | Refills: 0 | Status: ACTIVE | COMMUNITY
Start: 2022-03-04

## 2022-03-29 RX ORDER — EPINEPHRINE 0.3 MG/.3ML
0.3 INJECTION INTRAMUSCULAR
Qty: 2 | Refills: 0 | Status: ACTIVE | COMMUNITY
Start: 2022-02-10

## 2022-03-29 RX ORDER — LANCETS 30 GAUGE
EACH MISCELLANEOUS
Qty: 300 | Refills: 0 | Status: ACTIVE | COMMUNITY
Start: 2022-01-11

## 2022-03-29 RX ORDER — DOCUSATE SODIUM 100 MG/1
100 CAPSULE, LIQUID FILLED ORAL
Qty: 90 | Refills: 0 | Status: ACTIVE | COMMUNITY
Start: 2021-05-26

## 2022-03-29 RX ORDER — SODIUM SULFATE, MAGNESIUM SULFATE, AND POTASSIUM CHLORIDE 17.75; 2.7; 2.25 G/1; G/1; G/1
1479-225-188 TABLET ORAL
Qty: 24 | Refills: 0 | Status: ACTIVE | COMMUNITY
Start: 2021-11-03

## 2022-03-29 RX ORDER — SACUBITRIL AND VALSARTAN 24; 26 MG/1; MG/1
24-26 TABLET, FILM COATED ORAL
Qty: 90 | Refills: 0 | Status: ACTIVE | COMMUNITY
Start: 2021-12-21

## 2022-03-29 RX ORDER — PEN NEEDLE, DIABETIC 29 G X1/2"
31G X 8 MM NEEDLE, DISPOSABLE MISCELLANEOUS
Qty: 100 | Refills: 0 | Status: ACTIVE | COMMUNITY
Start: 2022-03-03

## 2022-03-29 RX ORDER — METOPROLOL SUCCINATE 100 MG/1
100 TABLET, EXTENDED RELEASE ORAL
Qty: 90 | Refills: 0 | Status: ACTIVE | COMMUNITY
Start: 2021-12-21

## 2022-03-29 RX ORDER — ALBUTEROL SULFATE 90 UG/1
108 (90 BASE) INHALANT RESPIRATORY (INHALATION)
Qty: 18 | Refills: 0 | Status: ACTIVE | COMMUNITY
Start: 2021-11-22

## 2022-03-29 RX ORDER — AMITRIPTYLINE HYDROCHLORIDE 100 MG/1
100 TABLET, FILM COATED ORAL
Qty: 30 | Refills: 0 | Status: ACTIVE | COMMUNITY
Start: 2022-03-21

## 2022-03-29 RX ORDER — SODIUM SULFATE, POTASSIUM SULFATE, MAGNESIUM SULFATE 17.5; 3.13; 1.6 G/ML; G/ML; G/ML
17.5-3.13-1.6 SOLUTION, CONCENTRATE ORAL
Qty: 354 | Refills: 0 | Status: ACTIVE | COMMUNITY
Start: 2021-12-09

## 2022-03-29 RX ORDER — BUPROPION HYDROCHLORIDE 150 MG/1
150 TABLET, EXTENDED RELEASE ORAL
Qty: 30 | Refills: 0 | Status: ACTIVE | COMMUNITY
Start: 2022-03-21

## 2022-03-29 RX ORDER — VALACYCLOVIR HYDROCHLORIDE 500 MG/1
500 TABLET, FILM COATED ORAL
Qty: 30 | Refills: 0 | Status: ACTIVE | COMMUNITY
Start: 2022-03-04

## 2022-03-29 RX ORDER — INSULIN DETEMIR 100 [IU]/ML
100 INJECTION, SOLUTION SUBCUTANEOUS
Qty: 30 | Refills: 0 | Status: ACTIVE | COMMUNITY
Start: 2021-10-22

## 2022-03-29 RX ORDER — MECLIZINE HYDROCHLORIDE 25 MG/1
25 TABLET ORAL
Qty: 90 | Refills: 0 | Status: ACTIVE | COMMUNITY
Start: 2022-02-04

## 2022-03-29 RX ORDER — ISOPROPYL ALCOHOL 0.7 ML/ML
SWAB TOPICAL
Qty: 400 | Refills: 0 | Status: ACTIVE | COMMUNITY
Start: 2022-01-11

## 2022-03-29 RX ORDER — INSULIN LISPRO 100 [IU]/ML
100 INJECTION, SOLUTION INTRAVENOUS; SUBCUTANEOUS
Qty: 45 | Refills: 0 | Status: ACTIVE | COMMUNITY
Start: 2021-10-18

## 2022-03-29 RX ORDER — OXYCODONE AND ACETAMINOPHEN 10; 325 MG/1; MG/1
10-325 TABLET ORAL
Qty: 90 | Refills: 0 | Status: ACTIVE | COMMUNITY
Start: 2022-03-02

## 2022-03-29 RX ORDER — BUPROPION HYDROCHLORIDE 100 MG/1
100 TABLET, FILM COATED ORAL
Qty: 30 | Refills: 0 | Status: ACTIVE | COMMUNITY
Start: 2021-10-05

## 2022-03-29 NOTE — HISTORY OF PRESENT ILLNESS
[de-identified] : Patient referred by Dr. Corona for evaluation of large multinodular goiter and primary hyperparathyroidism.  Patient reports a long history of thyroid enlargement with worsening dysphagia.  Prior biopsies benign.  Reports occasional voice change with history of reflux, denies shortness of breath or radiation exposure.  Thyroid ultrasound May 2021: Right lobe 6.7 x 3.1 x 3.9 cm with 4.5 x 4 x 3.2 cm nodule.  Left lobe 4.3 x 1.3 x 1.9 cm with mid nodule 2 x 1 x 1.2 cm and lower 6 x 3 x 5 mm nodule.  Isthmus nodule 13 x 10 x 9 mm.  No enlarged lymph nodes.  Biopsy left mid nodule December 2020 benign.  Blood work August 2021: Calcium 10.3, creatinine 1.1, PTH 69, vitamin D 38.  Sestamibi August 2021: Possible parathyroid posterior to right upper pole.\par 3/28/22 Right substernal goiter excision and left inferior parathyroidectomy.  Patient denies dysphagia, hoarseness, pain or parathesias.  MARCIAL benavidez.   I have reviewed all old and new data and available images.  Additional information was obtained from others present at the time of the visit to ensure the completeness of the history.\par

## 2022-03-29 NOTE — PHYSICAL EXAM
[de-identified] : dressing intact, MARCIAL removed. Short thick neck, no cervical or supraclavicular adenopathy, trachea midline, thyroid with R>L  enlargement extending behind clavicle [Normal] : no neck adenopathy [de-identified] : Skin:  normal appearance.  no rash, nodules, vesicles, or erythema,\par Musculoskeletal:  full range of motion and no deformities appreciated\par Neurological:  grossly intact\par Psychiatric:  oriented to person, place and time with appropriate affect

## 2022-03-29 NOTE — ASSESSMENT
[FreeTextEntry1] : Patient with enlarging increasingly symptomatic substernal goiter and blood work consistent with mild primary hyperparathyroidism.  Patient doing well postop.  MARCIAL removed. jena sent. RTO 1 week  I have answered their questions to the best of my ability.\par

## 2022-04-01 ENCOUNTER — NON-APPOINTMENT (OUTPATIENT)
Age: 57
End: 2022-04-01

## 2022-04-01 LAB
25(OH)D3 SERPL-MCNC: 33.1 NG/ML
CALCIUM SERPL-MCNC: 10.2 MG/DL
CALCIUM SERPL-MCNC: 10.2 MG/DL
PARATHYROID HORMONE INTACT: 7 PG/ML

## 2022-04-05 ENCOUNTER — APPOINTMENT (OUTPATIENT)
Dept: SURGERY | Facility: CLINIC | Age: 57
End: 2022-04-05
Payer: MEDICAID

## 2022-04-05 LAB — SURGICAL PATHOLOGY STUDY: SIGNIFICANT CHANGE UP

## 2022-04-05 PROCEDURE — 99024 POSTOP FOLLOW-UP VISIT: CPT

## 2022-04-05 PROCEDURE — 36415 COLL VENOUS BLD VENIPUNCTURE: CPT

## 2022-04-05 NOTE — PHYSICAL EXAM
[de-identified] : Incision healing with min swelling, scar min discussed.  Short thick neck, no cervical or supraclavicular adenopathy, trachea midline, thyroid with R>L  enlargement extending behind clavicle [Normal] : no neck adenopathy [de-identified] : Skin:  normal appearance.  no rash, nodules, vesicles, or erythema,\par Musculoskeletal:  full range of motion and no deformities appreciated\par Neurological:  grossly intact\par Psychiatric:  oriented to person, place and time with appropriate affect

## 2022-04-05 NOTE — HISTORY OF PRESENT ILLNESS
[de-identified] : Patient referred by Dr. Corona for evaluation of large multinodular goiter and primary hyperparathyroidism.  Patient reports a long history of thyroid enlargement with worsening dysphagia.  Prior biopsies benign.  Reports occasional voice change with history of reflux, denies shortness of breath or radiation exposure.  Thyroid ultrasound May 2021: Right lobe 6.7 x 3.1 x 3.9 cm with 4.5 x 4 x 3.2 cm nodule.  Left lobe 4.3 x 1.3 x 1.9 cm with mid nodule 2 x 1 x 1.2 cm and lower 6 x 3 x 5 mm nodule.  Isthmus nodule 13 x 10 x 9 mm.  No enlarged lymph nodes.  Biopsy left mid nodule December 2020 benign.  Blood work August 2021: Calcium 10.3, creatinine 1.1, PTH 69, vitamin D 38.  Sestamibi August 2021: Possible parathyroid posterior to right upper pole.\par 3/28/22 Right substernal goiter excision and left inferior parathyroidectomy.  Patient denies dysphagia, hoarseness, pain or parathesias.  I have reviewed all old and new data and available images.  Additional information was obtained from others present at the time of the visit to ensure the completeness of the history.\par

## 2022-04-05 NOTE — ASSESSMENT
[FreeTextEntry1] : Patient with enlarging increasingly symptomatic substernal goiter and blood work consistent with mild primary hyperparathyroidism.  Patient doing well postop. . jena sent. RTO 6 week  I have answered their questions to the best of my ability.\par

## 2022-04-07 ENCOUNTER — NON-APPOINTMENT (OUTPATIENT)
Age: 57
End: 2022-04-07

## 2022-04-07 LAB
25(OH)D3 SERPL-MCNC: 45.5 NG/ML
CALCIUM SERPL-MCNC: 9.6 MG/DL
CALCIUM SERPL-MCNC: 9.6 MG/DL
PARATHYROID HORMONE INTACT: 25 PG/ML

## 2022-05-17 ENCOUNTER — APPOINTMENT (OUTPATIENT)
Dept: SURGERY | Facility: CLINIC | Age: 57
End: 2022-05-17
Payer: MEDICAID

## 2022-05-17 PROCEDURE — 99024 POSTOP FOLLOW-UP VISIT: CPT

## 2022-05-17 PROCEDURE — 36415 COLL VENOUS BLD VENIPUNCTURE: CPT

## 2022-05-17 NOTE — HISTORY OF PRESENT ILLNESS
[de-identified] : Patient referred by Dr. Corona for evaluation of large multinodular goiter and primary hyperparathyroidism.  Patient reports a long history of thyroid enlargement with worsening dysphagia.  Prior biopsies benign.  Reports occasional voice change with history of reflux, denies shortness of breath or radiation exposure.  Thyroid ultrasound May 2021: Right lobe 6.7 x 3.1 x 3.9 cm with 4.5 x 4 x 3.2 cm nodule.  Left lobe 4.3 x 1.3 x 1.9 cm with mid nodule 2 x 1 x 1.2 cm and lower 6 x 3 x 5 mm nodule.  Isthmus nodule 13 x 10 x 9 mm.  No enlarged lymph nodes.  Biopsy left mid nodule December 2020 benign.  Blood work August 2021: Calcium 10.3, creatinine 1.1, PTH 69, vitamin D 38.  Sestamibi August 2021: Possible parathyroid posterior to right upper pole.\par 3/28/22 Right substernal goiter excision and left inferior parathyroidectomy. Path benign.  Patient denies dysphagia, hoarseness, pain or parathesias.  I have reviewed all old and new data and available images.  Additional information was obtained from others present at the time of the visit to ensure the completeness of the history.\par

## 2022-05-17 NOTE — PHYSICAL EXAM
[de-identified] : Incision healing with min swelling, scar min discussed.  Short thick neck, no cervical or supraclavicular adenopathy, trachea midline, thyroid with R>L  enlargement extending behind clavicle [Normal] : no neck adenopathy [de-identified] : Skin:  normal appearance.  no rash, nodules, vesicles, or erythema,\par Musculoskeletal:  full range of motion and no deformities appreciated\par Neurological:  grossly intact\par Psychiatric:  oriented to person, place and time with appropriate affect

## 2022-05-17 NOTE — ASSESSMENT
[FreeTextEntry1] : Patient with enlarging increasingly symptomatic substernal goiter and blood work consistent with mild primary hyperparathyroidism.  Patient doing well postop. path ebenign.   . jena sent. RTO 4 mo.  thyroid US 9/2022.    I have answered their questions to the best of my ability.\par

## 2022-05-18 ENCOUNTER — NON-APPOINTMENT (OUTPATIENT)
Age: 57
End: 2022-05-18

## 2022-05-18 LAB
25(OH)D3 SERPL-MCNC: 55.3 NG/ML
CALCIUM SERPL-MCNC: 9 MG/DL
CALCIUM SERPL-MCNC: 9 MG/DL
PARATHYROID HORMONE INTACT: 71 PG/ML
T3 SERPL-MCNC: 111 NG/DL
T4 FREE SERPL-MCNC: 0.8 NG/DL
TSH SERPL-ACNC: 1.51 UIU/ML

## 2022-09-06 ENCOUNTER — APPOINTMENT (OUTPATIENT)
Dept: SURGERY | Facility: CLINIC | Age: 57
End: 2022-09-06

## 2022-09-06 PROCEDURE — 99213 OFFICE O/P EST LOW 20 MIN: CPT

## 2022-09-06 PROCEDURE — 36415 COLL VENOUS BLD VENIPUNCTURE: CPT

## 2022-09-06 NOTE — PHYSICAL EXAM
[de-identified] : Incision healing well, scar min discussed.  Short thick neck, no cervical or supraclavicular adenopathy, trachea midline, thyroid with R>L  enlargement extending behind clavicle [Normal] : no neck adenopathy [de-identified] : Skin:  normal appearance.  no rash, nodules, vesicles, or erythema,\par Musculoskeletal:  full range of motion and no deformities appreciated\par Neurological:  grossly intact\par Psychiatric:  oriented to person, place and time with appropriate affect

## 2022-09-06 NOTE — ASSESSMENT
[FreeTextEntry1] : Patient with substernal goiter and   primary hyperparathyroidism.  Patient doing well postop. path benign.     . jena sent. RTO 6 mo. .  thyroid US Now.    I have answered their questions to the best of my ability.\par Back computer thank you

## 2022-09-06 NOTE — HISTORY OF PRESENT ILLNESS
[de-identified] : Patient referred by Dr. Corona for evaluation of large multinodular goiter and primary hyperparathyroidism.  Patient reports a long history of thyroid enlargement with worsening dysphagia.  Prior biopsies benign.  Reports occasional voice change with history of reflux, denies shortness of breath or radiation exposure.  Thyroid ultrasound May 2021: Right lobe 6.7 x 3.1 x 3.9 cm with 4.5 x 4 x 3.2 cm nodule.  Left lobe 4.3 x 1.3 x 1.9 cm with mid nodule 2 x 1 x 1.2 cm and lower 6 x 3 x 5 mm nodule.  Isthmus nodule 13 x 10 x 9 mm.  No enlarged lymph nodes.  Biopsy left mid nodule December 2020 benign.  Blood work August 2021: Calcium 10.3, creatinine 1.1, PTH 69, vitamin D 38.  Sestamibi August 2021: Possible parathyroid posterior to right upper pole.\par 3/28/22 Right substernal goiter excision and left inferior parathyroidectomy. Path benign.  Patient denies dysphagia, hoarseness, pain or parathesias.  Has not had follow-up ultrasound.  I have reviewed all old and new data and available images.  Additional information was obtained from others present at the time of the visit to ensure the completeness of the history.\par

## 2022-09-12 ENCOUNTER — NON-APPOINTMENT (OUTPATIENT)
Age: 57
End: 2022-09-12

## 2022-09-12 LAB
25(OH)D3 SERPL-MCNC: 41.7 NG/ML
CALCIUM SERPL-MCNC: 9.9 MG/DL
CALCIUM SERPL-MCNC: 9.9 MG/DL
PARATHYROID HORMONE INTACT: 26 PG/ML
T3 SERPL-MCNC: 116 NG/DL
T4 FREE SERPL-MCNC: 1.1 NG/DL
TSH SERPL-ACNC: 1.43 UIU/ML

## 2023-07-13 ENCOUNTER — APPOINTMENT (OUTPATIENT)
Dept: SURGERY | Facility: CLINIC | Age: 58
End: 2023-07-13
Payer: MEDICARE

## 2023-07-13 VITALS
DIASTOLIC BLOOD PRESSURE: 80 MMHG | BODY MASS INDEX: 35.31 KG/M2 | SYSTOLIC BLOOD PRESSURE: 120 MMHG | HEIGHT: 67 IN | WEIGHT: 225 LBS

## 2023-07-13 DIAGNOSIS — E04.9 NONTOXIC GOITER, UNSPECIFIED: ICD-10-CM

## 2023-07-13 DIAGNOSIS — E21.0 PRIMARY HYPERPARATHYROIDISM: ICD-10-CM

## 2023-07-13 DIAGNOSIS — E04.2 NONTOXIC MULTINODULAR GOITER: ICD-10-CM

## 2023-07-13 PROCEDURE — 99213 OFFICE O/P EST LOW 20 MIN: CPT

## 2023-07-13 NOTE — HISTORY OF PRESENT ILLNESS
[de-identified] : Patient referred by Dr. Corona for evaluation of large multinodular goiter and primary hyperparathyroidism.  Patient reports a long history of thyroid enlargement with worsening dysphagia.  Prior biopsies benign.  Reports occasional voice change with history of reflux, denies shortness of breath or radiation exposure.  Thyroid ultrasound May 2021: Right lobe 6.7 x 3.1 x 3.9 cm with 4.5 x 4 x 3.2 cm nodule.  Left lobe 4.3 x 1.3 x 1.9 cm with mid nodule 2 x 1 x 1.2 cm and lower 6 x 3 x 5 mm nodule.  Isthmus nodule 13 x 10 x 9 mm.  No enlarged lymph nodes.  Biopsy left mid nodule December 2020 benign.  Blood work August 2021: Calcium 10.3, creatinine 1.1, PTH 69, vitamin D 38.  Sestamibi August 2021: Possible parathyroid posterior to right upper pole.\par 3/28/22 Right substernal goiter excision and left inferior parathyroidectomy. Path benign.  Patient denies dysphagia, hoarseness, pain or parathesias.    follow-up ultrasound 9/2022 stable nodules.  I have reviewed all old and new data and available images.  Additional information was obtained from others present at the time of the visit to ensure the completeness of the history.\par

## 2023-07-13 NOTE — PHYSICAL EXAM
[de-identified] : Incision healing well, scar min discussed.  Short thick neck, no cervical or supraclavicular adenopathy, trachea midline, thyroid with R>L  enlargement extending behind clavicle [Normal] : no neck adenopathy [de-identified] : Skin:  normal appearance.  no rash, nodules, vesicles, or erythema,\par Musculoskeletal:  full range of motion and no deformities appreciated\par Neurological:  grossly intact\par Psychiatric:  oriented to person, place and time with appropriate affect

## 2023-07-13 NOTE — ASSESSMENT
[FreeTextEntry1] : Patient with substernal goiter and   primary hyperparathyroidism.  Patient doing well postop. path benign.  nodules stable on US,  patietn will continue with yearly blood work and contact office if change noted.     I have answered their questions to the best of my ability.\par

## 2024-11-03 ENCOUNTER — EMERGENCY (EMERGENCY)
Facility: HOSPITAL | Age: 59
LOS: 0 days | Discharge: HOME | End: 2024-11-03
Attending: STUDENT IN AN ORGANIZED HEALTH CARE EDUCATION/TRAINING PROGRAM
Payer: MEDICARE

## 2024-11-03 VITALS
TEMPERATURE: 98 F | HEART RATE: 105 BPM | HEIGHT: 63 IN | OXYGEN SATURATION: 95 % | SYSTOLIC BLOOD PRESSURE: 131 MMHG | WEIGHT: 229.94 LBS | DIASTOLIC BLOOD PRESSURE: 89 MMHG | RESPIRATION RATE: 18 BRPM

## 2024-11-03 DIAGNOSIS — H92.02 OTALGIA, LEFT EAR: ICD-10-CM

## 2024-11-03 DIAGNOSIS — R09.A9 FOREIGN BODY SENSATION, OTHER SITE: ICD-10-CM

## 2024-11-03 DIAGNOSIS — R09.81 NASAL CONGESTION: ICD-10-CM

## 2024-11-03 DIAGNOSIS — Z88.0 ALLERGY STATUS TO PENICILLIN: ICD-10-CM

## 2024-11-03 DIAGNOSIS — E78.5 HYPERLIPIDEMIA, UNSPECIFIED: ICD-10-CM

## 2024-11-03 DIAGNOSIS — Z98.89 OTHER SPECIFIED POSTPROCEDURAL STATES: Chronic | ICD-10-CM

## 2024-11-03 DIAGNOSIS — Z88.1 ALLERGY STATUS TO OTHER ANTIBIOTIC AGENTS: ICD-10-CM

## 2024-11-03 DIAGNOSIS — I10 ESSENTIAL (PRIMARY) HYPERTENSION: ICD-10-CM

## 2024-11-03 DIAGNOSIS — E11.9 TYPE 2 DIABETES MELLITUS WITHOUT COMPLICATIONS: ICD-10-CM

## 2024-11-03 DIAGNOSIS — Z87.19 PERSONAL HISTORY OF OTHER DISEASES OF THE DIGESTIVE SYSTEM: Chronic | ICD-10-CM

## 2024-11-03 PROCEDURE — 99283 EMERGENCY DEPT VISIT LOW MDM: CPT

## 2024-11-03 NOTE — ED PROVIDER NOTE - PHYSICAL EXAMINATION
GEN: Awake, alert, interactive, NAD.  HEAD AND NECK: NC/AT. Airway patent. Neck supple.   EYES:  Clear b/l. EOMI. PERRL.   ENT: Moist mucus membranes. BL external canals clean w/o cerumen and BL TMs clear w/ good light reflex w/o erythema or effusion. Posterior oropharynx w/ mild erythema, w/o edema or exudate.   CARDIAC: Regular rate, regular rhythm. No evident pedal edema.    RESP/CHEST: Normal respiratory effort with no use of accessory muscles or retractions. Clear throughout on auscultation.  ABD: Soft, non-distended, non-tender. No rebound, no guarding.   BACK: No midline spinal TTP. No CVAT.   EXTREMITIES: Moving all extremities with no apparent deformities.   SKIN: Warm, dry, intact normal color. No rash.   NEURO: AOx3, CN II-XII grossly intact, no focal deficits.   PSYCH: Appropriate mood and affect.

## 2024-11-03 NOTE — ED PROVIDER NOTE - CARE PROVIDER_API CALL
Ivan Quiroga  Otolaryngology  [address], [place] [address]  Phone: [phone]  Fax: [phone]  Follow Up Time: 7-10 Days

## 2024-11-03 NOTE — ED PROVIDER NOTE - PATIENT PORTAL LINK FT
You can access the FollowMyHealth Patient Portal offered by [facility] by registering at the following website: [email or web address]. By joining [employer]’s FollowMyHealth portal, you will also be able to view your health information using other applications (apps) compatible with our system.

## 2024-11-03 NOTE — ED PROVIDER NOTE - OBJECTIVE STATEMENT
59F 59F PMH Bipolar, anxiety / depression, HTN, HLD, DM pw 4-5mo L ear discomfort. Pt reports Cardio looked into her ear few months ago and did notice anything amiss. Pt reports x past few days w/ 'raw' throat w/ radiation to BL ears. Pt reports L ear w/ muffled hearing, feels like something is caught in her ear. Using Q-tips to clean, but w/o relief. Pt reports nasal congestion / rhinorrhea 2/2 allergies. ROS otherwise negative: Denies F/C, h/a, dizziness, CP, SOB, cough, abd pain, flank pain, N/V/D/C, UTI sx, LE pain / swelling.     PMH as above, PSH none, Alllergy Clinda, PCN, Flagyl, Meds as listed.

## 2024-11-03 NOTE — ED ADULT NURSE NOTE - OBJECTIVE STATEMENT
received er c/o l ear clogged sensation intermittently over past few months, denies any pain at present. reports intermittent throat discomfort denies at present no fever/chills ear d/c

## 2024-11-03 NOTE — ED PROVIDER NOTE - PROGRESS NOTE DETAILS
NP NOTE: Pt seen by intake physician and me; HPI/ROS/PE/MDM reviewed.   Re-Evaluation: Ear canals clear without any visualized FB or trauma, TM intact, no middle ear effusion. no erythema. Posterior pharynx slightly erythematous without cobblestoning, no tonsilar exudate, erythema, or edema.  d/c with ENT fu.

## 2024-11-03 NOTE — ED PROVIDER NOTE - CLINICAL SUMMARY MEDICAL DECISION MAKING FREE TEXT BOX
59F PMH Bipolar, anxiety / depression, HTN, HLD, DM pw 4-5mo L ear discomfort. Afebrile, VSS. Pt overall well-appearing, in NAD. Exam as noted in PE. W/o evidence OM / OE / impaction or FB. Stable for d/c home. Recommend trial OTC antiallergy medications (such as Claritin, Allegra, Zyrtec) PRN symptomatic relief as well as continued tea w/ honey. Given / instructed for close outpatient ENT and PCP f/u. Return signs / symptoms d/w pt. She understands / agrees w/ this plan.

## 2024-11-03 NOTE — ED ADULT TRIAGE NOTE - CHIEF COMPLAINT QUOTE
pt c/o foreign body in left ear x 4-5 months, worse over past 2 weeks. affects hearing at time. haven't been seen provider previously for symptoms. pt also c/o sore throat. hx: Bipolar, Anxiety, HTN, high cholesterol, DM.

## 2024-11-03 NOTE — ED ADULT NURSE NOTE - NSICDXPASTMEDICALHX_GEN_ALL_CORE_FT
PAST MEDICAL HISTORY:  Acid reflux     Arthritis     Asthma no recent exacerbation    Carpal tunnel syndrome     Chronic systolic heart failure     Diverticulosis     DM2 (diabetes mellitus, type 2)     History of 2019 novel coronavirus disease (COVID-19) 4/2020, 1/2022 - not hospitalized    HLD (hyperlipidemia)     HTN (hypertension)     Primary hyperparathyroidism     Pulmonary hypertension     Sleep apnea, obstructive on CPAP mask    Thyroid nodule     Vertigo      PAST MEDICAL HISTORY:  Acid reflux     Anxiety     Arthritis     Asthma no recent exacerbation    Bipolar disorder     Carpal tunnel syndrome     Chronic systolic heart failure     Diverticulosis     DM2 (diabetes mellitus, type 2)     History of 2019 novel coronavirus disease (COVID-19) 4/2020, 1/2022 - not hospitalized    HLD (hyperlipidemia)     HTN (hypertension)     Primary hyperparathyroidism     Pulmonary hypertension     Sleep apnea, obstructive on CPAP mask    Thyroid nodule     Vertigo

## (undated) DEVICE — DRAPE LAPAROTOMY TRANSVERSE

## (undated) DEVICE — SOL IRR POUR H2O 1500ML

## (undated) DEVICE — SUCTION YANKAUER TAPERED BULBOUS NO VENT

## (undated) DEVICE — SPONGE PEANUT AUTO COUNT

## (undated) DEVICE — BLANKET WARMER LOWER ADULT

## (undated) DEVICE — POSITIONER FOAM EGG CRATE ULNAR (2PCS)

## (undated) DEVICE — DRAIN RESERVOIR FOR JACKSON PRATT 100CC CARDINAL

## (undated) DEVICE — DRAPE MAGNETIC INSTRUMENT MEDIUM

## (undated) DEVICE — DRSG 3M BENZOIN 0.6CC

## (undated) DEVICE — SUT POLYSORB 2-0 30" V-20 UNDYED

## (undated) DEVICE — SAFETY PIN

## (undated) DEVICE — DRSG STERISTRIPS 0.5X4"

## (undated) DEVICE — DRAPE INSTRUMENT POUCH

## (undated) DEVICE — DRAPE FLUID WARMER 44X44"

## (undated) DEVICE — SUT MONOCRYL 4-0 27" PS-2 UNDYED

## (undated) DEVICE — LABEL MED BLANK W PEN

## (undated) DEVICE — ELCTR BOVIE HANDPIECE PENCIL

## (undated) DEVICE — SYR ASEPTO

## (undated) DEVICE — SUT VICRYL 2-0 18" TIES UNDYED

## (undated) DEVICE — DRAIN JACKSON PRATT 7MM FLAT FULL NO TROCAR

## (undated) DEVICE — SYR LUER LOK 10CC

## (undated) DEVICE — SUT ETHILON 3-0 18" FS-1

## (undated) DEVICE — SPONGE RAYTEC 4X4 16PLY

## (undated) DEVICE — NDL HYPO SAFE 25G X 1.5"

## (undated) DEVICE — SUT VICRYL 0 18" TIES UNDYED

## (undated) DEVICE — SUT CHROMIC 4-0 30" V-20

## (undated) DEVICE — GLV 6.5 PROTEXIS

## (undated) DEVICE — DRAPE TOWEL BLUE 17" X 24"

## (undated) DEVICE — WRAP COMPRESSION CALF MED

## (undated) DEVICE — SUT VICRYL 3-0 18" TIES UNDYED

## (undated) DEVICE — TUBING 10FT W/WAND

## (undated) DEVICE — SUT SILK 2-0 30" SH

## (undated) DEVICE — PACK MINOR

## (undated) DEVICE — DRAPE 3/4 SHEET 52X76"

## (undated) DEVICE — ELCTR GROUNDING PAD ADULT COVIDIEN